# Patient Record
Sex: MALE | URBAN - METROPOLITAN AREA
[De-identification: names, ages, dates, MRNs, and addresses within clinical notes are randomized per-mention and may not be internally consistent; named-entity substitution may affect disease eponyms.]

---

## 2018-04-17 ENCOUNTER — EMERGENCY (EMERGENCY)
Facility: HOSPITAL | Age: 38
LOS: 1 days | Discharge: HOME | End: 2018-04-17
Attending: EMERGENCY MEDICINE | Admitting: EMERGENCY MEDICINE
Payer: COMMERCIAL

## 2018-04-17 VITALS
OXYGEN SATURATION: 99 % | TEMPERATURE: 99 F | HEART RATE: 92 BPM | RESPIRATION RATE: 17 BRPM | SYSTOLIC BLOOD PRESSURE: 142 MMHG | DIASTOLIC BLOOD PRESSURE: 82 MMHG

## 2018-04-17 VITALS
RESPIRATION RATE: 18 BRPM | WEIGHT: 220.02 LBS | HEART RATE: 108 BPM | OXYGEN SATURATION: 98 % | SYSTOLIC BLOOD PRESSURE: 155 MMHG | TEMPERATURE: 99 F | DIASTOLIC BLOOD PRESSURE: 89 MMHG

## 2018-04-17 PROCEDURE — 10061 I&D ABSCESS COMP/MULTIPLE: CPT

## 2018-04-17 PROCEDURE — 99283 EMERGENCY DEPT VISIT LOW MDM: CPT | Mod: 25

## 2018-04-17 RX ORDER — CEPHALEXIN 500 MG
1 CAPSULE ORAL
Qty: 28 | Refills: 0 | OUTPATIENT
Start: 2018-04-17 | End: 2018-04-23

## 2018-04-17 RX ORDER — AZTREONAM 2 G
1 VIAL (EA) INJECTION
Qty: 14 | Refills: 0 | OUTPATIENT
Start: 2018-04-17 | End: 2018-04-23

## 2018-04-17 RX ORDER — CEPHALEXIN 500 MG
500 CAPSULE ORAL ONCE
Qty: 0 | Refills: 0 | Status: COMPLETED | OUTPATIENT
Start: 2018-04-17 | End: 2018-04-17

## 2018-04-17 RX ORDER — LIDOCAINE HCL 20 MG/ML
3 VIAL (ML) INJECTION ONCE
Qty: 0 | Refills: 0 | Status: COMPLETED | OUTPATIENT
Start: 2018-04-17 | End: 2018-04-17

## 2018-04-17 RX ADMIN — Medication 3 MILLILITER(S): at 09:48

## 2018-04-17 RX ADMIN — Medication 1 TABLET(S): at 10:15

## 2018-04-17 RX ADMIN — Medication 500 MILLIGRAM(S): at 10:15

## 2018-04-17 NOTE — ED ADULT TRIAGE NOTE - CHIEF COMPLAINT QUOTE
c/o possible abscess to back of right neck x 1 week after picking at it, worsening 2 days. pt sent in from  for further evaluation due to pain and tenderness. +swelling +redness +warm to touch +tender. Denies fever.

## 2018-04-17 NOTE — ED PROVIDER NOTE - OBJECTIVE STATEMENT
Pt is a 39yo M with no PMH but is currently on prednisone and nasal spray (name unknown) for possible sinusitis.  Being treated by [facility] in [place].  Noted pain and tenderness to R posterior neck 1 week ago.  Noted worsening and swelling x 2 days.  Went to [facility] on [address] and sent here for further evaluation. Pt is a 37yo M with no PMH but is currently on prednisone and nasal spray (name unknown) for possible sinusitis.  Being treated by [facility] in [place].  Noted pain and tenderness to R posterior neck 1 week ago.  Noted worsening and swelling x 2 days.  Went to [facility] on [address] and sent here for further evaluation.  Admits that sxs started after his wife tried to pop the blackhead on the back of his neck.

## 2018-04-17 NOTE — ED PROVIDER NOTE - PHYSICAL EXAMINATION
VITAL SIGNS: I have reviewed nursing notes and confirm.  CONSTITUTIONAL: Well-developed; well-nourished; in no acute distress.  SKIN: ~4x4cm fluctuant abscess to R posterior neck with overlying cellulitis, ~8x6cm.    HEAD: Normocephalic; atraumatic.  EYES: PERRL, EOM intact; conjunctiva and sclera clear.  ENT: No nasal discharge; airway clear.  NECK: Supple; non tender.  CARD: S1, S2 normal; no murmurs, gallops, or rubs. Regular rate and rhythm.  RESP: No wheezes, rales or rhonchi.  ABD: Normal bowel sounds; soft; non-distended; non-tender; no hepatosplenomegaly.  EXT: Normal ROM. No clubbing, cyanosis or edema.  NEURO: Alert, oriented. Grossly unremarkable.  PSYCH: Cooperative, appropriate. VITAL SIGNS: I have reviewed nursing notes and confirm.  CONSTITUTIONAL: Well-developed; well-nourished; in no acute distress.  SKIN: ~4x4cm fluctuant abscess to R posterior neck with overlying cellulitis, ~8x6cm.    HEAD: Normocephalic; atraumatic.  EYES: PERRL, EOM intact; conjunctiva and sclera clear.  ENT: No nasal discharge; airway clear.  NECK: Supple; non tender.  CARD: S1, S2 normal; no murmurs, gallops, or rubs. Regular rate and rhythm.  RESP: No wheezes, rales or rhonchi.  EXT: Normal ROM. No clubbing, cyanosis or edema.  NEURO: Alert, oriented. Grossly unremarkable.  PSYCH: Cooperative, appropriate.

## 2018-04-17 NOTE — ED PROVIDER NOTE - MEDICAL DECISION MAKING DETAILS
Abscess with surrounding cellulitis.  Will perform I&D (see procedure note) and place on PO antibiotic.  Discussed emergent infection return precautions.  Instructed to return for a wound check and packing removal in 48 hours.

## 2018-04-17 NOTE — ED ADULT NURSE NOTE - OBJECTIVE STATEMENT
pt is a 38 year old male who comes in complaining of wound check. pt states "I have always had issues with pimples and cysts, the back of my neck had a bump, my wife tried to pop it and now I think it is infected, it is very painful". pt denies fevers, chills, nausea, vomiting. pt reports he noticed it was getting worse for about a week now. upon exam, abscess noted to back of neck. no drainage or bleeding noted.

## 2018-04-21 DIAGNOSIS — L02.11 CUTANEOUS ABSCESS OF NECK: ICD-10-CM

## 2018-04-21 DIAGNOSIS — F17.290 NICOTINE DEPENDENCE, OTHER TOBACCO PRODUCT, UNCOMPLICATED: ICD-10-CM

## 2018-04-21 DIAGNOSIS — Z91.013 ALLERGY TO SEAFOOD: ICD-10-CM

## 2019-10-09 PROBLEM — Z00.00 ENCOUNTER FOR PREVENTIVE HEALTH EXAMINATION: Status: ACTIVE | Noted: 2019-10-09

## 2019-10-14 ENCOUNTER — APPOINTMENT (OUTPATIENT)
Dept: NEUROLOGY | Facility: CLINIC | Age: 39
End: 2019-10-14
Payer: COMMERCIAL

## 2019-10-14 VITALS
WEIGHT: 230 LBS | BODY MASS INDEX: 31.15 KG/M2 | HEART RATE: 73 BPM | HEIGHT: 72 IN | SYSTOLIC BLOOD PRESSURE: 129 MMHG | DIASTOLIC BLOOD PRESSURE: 89 MMHG | TEMPERATURE: 97.4 F

## 2019-10-14 DIAGNOSIS — Z80.3 FAMILY HISTORY OF MALIGNANT NEOPLASM OF BREAST: ICD-10-CM

## 2019-10-14 DIAGNOSIS — Z82.49 FAMILY HISTORY OF ISCHEMIC HEART DISEASE AND OTHER DISEASES OF THE CIRCULATORY SYSTEM: ICD-10-CM

## 2019-10-14 DIAGNOSIS — Z80.9 FAMILY HISTORY OF MALIGNANT NEOPLASM, UNSPECIFIED: ICD-10-CM

## 2019-10-14 DIAGNOSIS — R26.89 OTHER ABNORMALITIES OF GAIT AND MOBILITY: ICD-10-CM

## 2019-10-14 DIAGNOSIS — Z72.89 OTHER PROBLEMS RELATED TO LIFESTYLE: ICD-10-CM

## 2019-10-14 DIAGNOSIS — Z83.79 FAMILY HISTORY OF OTHER DISEASES OF THE DIGESTIVE SYSTEM: ICD-10-CM

## 2019-10-14 DIAGNOSIS — Z87.891 PERSONAL HISTORY OF NICOTINE DEPENDENCE: ICD-10-CM

## 2019-10-14 DIAGNOSIS — R20.0 ANESTHESIA OF SKIN: ICD-10-CM

## 2019-10-14 PROCEDURE — 99205 OFFICE O/P NEW HI 60 MIN: CPT

## 2019-10-14 NOTE — ASSESSMENT
[FreeTextEntry1] : Mr. Quezada is a 39 year old man with reduced sensation in the penis and scrotum, bilaterally. \par Exclude a compressive lesion with an MRI of the lumbar spine and pelvis.  \par Second opinion with a neuro-urologist.\par \par Episodes of a transient feeling of imbalance of unclear etiology.\par Rule out a structural lesion - MRI of brain.\par

## 2019-10-14 NOTE — REVIEW OF SYSTEMS
[Numbness] : numbness [Dizziness] : dizziness [Vertigo] : vertigo [Negative] : Heme/Lymph [de-identified] : tremor, feel off balance [FreeTextEntry8] : hot flashes.

## 2019-10-14 NOTE — HISTORY OF PRESENT ILLNESS
[FreeTextEntry1] : Mr. Quezada is a 39 year old man with over 6 months of symptoms. He has reduced sensation in the penis and scrotum, bilaterally. He does not have any loss of sensation in any other region. He has no pain. He has no history of trauma. He has no incontinence. He has control of his bowel movements and urine. He is able to get an erection but sometimes cannot sustain it due to decreased sensation. He is able to have an orgasm but it does not feel like it normally does.   He has seen a urologist who has done a comprehensive workup and has given treatments that have not helped the patient.  There has been no progressive worsening since onset.\par He has momentary episodes of feeling off balance. The episodes last for a few minutes. While walking he sometimes has to grab something to keep his balance. He had one episode lasting 20 minutes which was much more intense than in which his hands were shaking. He had no loss of consciousness.  \par

## 2019-10-14 NOTE — CONSULT LETTER
[Dear  ___] : Dear  [unfilled], [FreeTextEntry1] : I had the pleasure of evaluating your patient, SUSAN LEGER, Please see the assessment section below for a summary of my diagnostic impression and plan.\par \par Thank you very much for allowing me to participate in the care of this patient. If you have any questions, please do not hesitate to contact me.\par \par Sincerely,\par \par Aimee Cardona MD\par \par \par

## 2019-11-04 ENCOUNTER — RESULT REVIEW (OUTPATIENT)
Age: 39
End: 2019-11-04

## 2019-11-05 ENCOUNTER — RESULT REVIEW (OUTPATIENT)
Age: 39
End: 2019-11-05

## 2019-11-07 ENCOUNTER — RX RENEWAL (OUTPATIENT)
Age: 39
End: 2019-11-07

## 2019-11-07 RX ORDER — METHYLPREDNISOLONE 32 MG/1
32 TABLET ORAL
Qty: 2 | Refills: 0 | Status: ACTIVE | COMMUNITY
Start: 2019-11-07 | End: 1900-01-01

## 2019-11-07 RX ORDER — DIPHENHYDRAMINE HCL 50 MG/1
50 CAPSULE ORAL
Qty: 1 | Refills: 0 | Status: ACTIVE | COMMUNITY
Start: 2019-11-07 | End: 1900-01-01

## 2019-11-09 ENCOUNTER — RESULT REVIEW (OUTPATIENT)
Age: 39
End: 2019-11-09